# Patient Record
Sex: MALE | Race: BLACK OR AFRICAN AMERICAN | NOT HISPANIC OR LATINO | Employment: UNEMPLOYED | URBAN - METROPOLITAN AREA
[De-identification: names, ages, dates, MRNs, and addresses within clinical notes are randomized per-mention and may not be internally consistent; named-entity substitution may affect disease eponyms.]

---

## 2020-07-13 ENCOUNTER — LAB VISIT (OUTPATIENT)
Dept: LAB | Facility: HOSPITAL | Age: 20
End: 2020-07-13
Attending: INTERNAL MEDICINE
Payer: COMMERCIAL

## 2020-07-13 ENCOUNTER — OFFICE VISIT (OUTPATIENT)
Dept: FAMILY MEDICINE | Facility: CLINIC | Age: 20
End: 2020-07-13
Payer: COMMERCIAL

## 2020-07-13 VITALS
HEART RATE: 80 BPM | SYSTOLIC BLOOD PRESSURE: 120 MMHG | OXYGEN SATURATION: 99 % | TEMPERATURE: 97 F | WEIGHT: 160 LBS | HEIGHT: 71 IN | BODY MASS INDEX: 22.4 KG/M2 | DIASTOLIC BLOOD PRESSURE: 84 MMHG

## 2020-07-13 DIAGNOSIS — Z00.00 NORMAL PHYSICAL EXAM: Primary | ICD-10-CM

## 2020-07-13 DIAGNOSIS — Z11.3 ROUTINE SCREENING FOR STI (SEXUALLY TRANSMITTED INFECTION): ICD-10-CM

## 2020-07-13 DIAGNOSIS — Z00.00 NORMAL PHYSICAL EXAM: ICD-10-CM

## 2020-07-13 LAB
BASOPHILS # BLD AUTO: 0.01 K/UL (ref 0–0.2)
BASOPHILS NFR BLD: 0.3 % (ref 0–1.9)
DIFFERENTIAL METHOD: ABNORMAL
EOSINOPHIL # BLD AUTO: 0 K/UL (ref 0–0.5)
EOSINOPHIL NFR BLD: 0.3 % (ref 0–8)
ERYTHROCYTE [DISTWIDTH] IN BLOOD BY AUTOMATED COUNT: 11.9 % (ref 11.5–14.5)
HCT VFR BLD AUTO: 42.9 % (ref 40–54)
HGB BLD-MCNC: 14.6 G/DL (ref 14–18)
IMM GRANULOCYTES # BLD AUTO: 0.01 K/UL (ref 0–0.04)
IMM GRANULOCYTES NFR BLD AUTO: 0.3 % (ref 0–0.5)
LYMPHOCYTES # BLD AUTO: 1.1 K/UL (ref 1–4.8)
LYMPHOCYTES NFR BLD: 29.2 % (ref 18–48)
MCH RBC QN AUTO: 31.4 PG (ref 27–31)
MCHC RBC AUTO-ENTMCNC: 34 G/DL (ref 32–36)
MCV RBC AUTO: 92 FL (ref 82–98)
MONOCYTES # BLD AUTO: 0.3 K/UL (ref 0.3–1)
MONOCYTES NFR BLD: 6.5 % (ref 4–15)
NEUTROPHILS # BLD AUTO: 2.4 K/UL (ref 1.8–7.7)
NEUTROPHILS NFR BLD: 63.4 % (ref 38–73)
NRBC BLD-RTO: 0 /100 WBC
PLATELET # BLD AUTO: 184 K/UL (ref 150–350)
PMV BLD AUTO: 11.9 FL (ref 9.2–12.9)
RBC # BLD AUTO: 4.65 M/UL (ref 4.6–6.2)
WBC # BLD AUTO: 3.83 K/UL (ref 3.9–12.7)

## 2020-07-13 PROCEDURE — 36415 COLL VENOUS BLD VENIPUNCTURE: CPT | Mod: PO

## 2020-07-13 PROCEDURE — 99999 PR PBB SHADOW E&M-NEW PATIENT-LVL III: ICD-10-PCS | Mod: PBBFAC,,, | Performed by: INTERNAL MEDICINE

## 2020-07-13 PROCEDURE — 99999 PR PBB SHADOW E&M-NEW PATIENT-LVL III: CPT | Mod: PBBFAC,,, | Performed by: INTERNAL MEDICINE

## 2020-07-13 PROCEDURE — 84443 ASSAY THYROID STIM HORMONE: CPT

## 2020-07-13 PROCEDURE — 86706 HEP B SURFACE ANTIBODY: CPT

## 2020-07-13 PROCEDURE — 87340 HEPATITIS B SURFACE AG IA: CPT

## 2020-07-13 PROCEDURE — 99385 PREV VISIT NEW AGE 18-39: CPT | Mod: S$GLB,,, | Performed by: INTERNAL MEDICINE

## 2020-07-13 PROCEDURE — 86592 SYPHILIS TEST NON-TREP QUAL: CPT

## 2020-07-13 PROCEDURE — 99385 PR PREVENTIVE VISIT,NEW,18-39: ICD-10-PCS | Mod: S$GLB,,, | Performed by: INTERNAL MEDICINE

## 2020-07-13 PROCEDURE — 85025 COMPLETE CBC W/AUTO DIFF WBC: CPT

## 2020-07-13 PROCEDURE — 86703 HIV-1/HIV-2 1 RESULT ANTBDY: CPT

## 2020-07-13 PROCEDURE — 80053 COMPREHEN METABOLIC PANEL: CPT

## 2020-07-13 PROCEDURE — 80061 LIPID PANEL: CPT

## 2020-07-13 NOTE — PROGRESS NOTES
This note was created by combination of typed  and M-Modal dictation.  Transcription errors may be present.  If there are any questions, please contact me.    Assessment:     1. Normal physical exam  Comprehensive metabolic panel    CBC auto differential    Lipid Panel    TSH   2. Routine screening for STI (sexually transmitted infection)  C. trachomatis/N. gonorrhoeae by AMP DNA    Hepatitis B Surface Ab, Qualitative    Hepatitis B Surface Antigen    RPR    HIV 1/2 Ag/Ab (4th Gen)       Plan:   1. Check basic nonfasting labs.  He is more physical activity.  Recommended against inhaling substances in general  2. Girlfriend apparently tested positive for syphilis.  Long discussion with the patient.  Pretest counseling provided.  Check HIV, syphilis, hep B, chlamydia, gonorrhea.  If syphilis positive will need penicillin himself.  If negative no treatment      There are no discontinued medications.    meds sent this encounter:       Follow Up: No follow-ups on file. if all normal PE 3 years.    Subjective:     Chief Complaint   Patient presents with    [facility]  John is a 20 y.o. male, last appointment with this clinic was Visit date not found.    NP; previous was with pediatrics. Dr. Christopher Monson  Hx of childhood ADD in elementary school. Not on any meds since that time.    Requesting STI testing.  Girlfriend apparently diagnosed with syphilis.  She is getting IM penicillin injections for 3 weeks.  She apparently had this diagnosed on routine screening.  Apparently she was asymptomatic.  The patient himself is asymptomatic.  He has been monogamous with her since February.    He does note on review of systems that maybe he gets a hypopigmented rash on his shaft.  The past month or 2.  Painless.  No ulceration.  No growth.  No dysuria.  No discharge.    Needs more physical activity    THC use.  Recommended against inhaling substances as it can cause long term lung  problems/damage.      Patient Care Team:  Kenny Camejo MD as PCP - General (Internal Medicine)  Christopher Monson MD as Consulting Physician (Pediatrics)    There are no active problems to display for this patient.      PAST MEDICAL HISTORY:  History reviewed. No pertinent past medical history.    PAST SURGICAL HISTORY:  History reviewed. No pertinent surgical history.    Family History   Problem Relation Age of Onset    No Known Problems Mother     No Known Problems Father     No Known Problems Brother     Diabetes Maternal Grandmother     Hypertension Maternal Grandmother     No Known Problems Brother        SOCIAL HISTORY:  Social History     Socioeconomic History    Marital status: Single     Spouse name: Not on file    Number of children: Not on file    Years of education: Not on file    Highest education level: Not on file   Occupational History    Occupation: self employed - music studio   Social Needs    Financial resource strain: Not on file    Food insecurity     Worry: Not on file     Inability: Not on file    Transportation needs     Medical: Not on file     Non-medical: Not on file   Tobacco Use    Smoking status: Never Smoker    Smokeless tobacco: Never Used   Substance and Sexual Activity    Alcohol use: Yes     Comment: occasional, 2-3 times a month.     Drug use: Yes     Types: Marijuana    Sexual activity: Yes   Lifestyle    Physical activity     Days per week: Not on file     Minutes per session: Not on file    Stress: Not on file   Relationships    Social connections     Talks on phone: Not on file     Gets together: Not on file     Attends Islam service: Not on file     Active member of club or organization: Not on file     Attends meetings of clubs or organizations: Not on file     Relationship status: Not on file   Other Topics Concern    Not on file   Social History Narrative    Not on file       ALLERGIES AND MEDICATIONS: updated and reviewed.  Review of  "patient's allergies indicates:  Not on File  No current outpatient medications on file.     No current facility-administered medications for this visit.        Review of Systems   Constitutional: Negative for fever, malaise/fatigue and weight loss.   HENT: Negative for congestion.    Eyes: Negative for blurred vision and pain.   Respiratory: Negative for shortness of breath and wheezing.    Cardiovascular: Negative for chest pain, palpitations and leg swelling.   Gastrointestinal: Negative for abdominal pain, blood in stool, constipation, diarrhea and heartburn.   Genitourinary: Negative for dysuria, hematuria and urgency.   Musculoskeletal: Negative for joint pain.   Neurological: Negative for tingling, focal weakness, weakness and headaches.       Objective:   Physical Exam   Vitals:    07/13/20 1503   BP: 120/84   BP Location: Left arm   Patient Position: Sitting   BP Method: Medium (Manual)   Pulse: 80   Temp: 97 °F (36.1 °C)   TempSrc: Oral   SpO2: 99%   Weight: 72.6 kg (160 lb)   Height: 5' 11" (1.803 m)    Body mass index is 22.32 kg/m².            Physical Exam  Constitutional:       Appearance: Normal appearance. He is well-developed.   HENT:      Right Ear: Tympanic membrane and external ear normal.      Left Ear: Tympanic membrane and external ear normal.      Nose: Nose normal.   Eyes:      General: No scleral icterus.     Conjunctiva/sclera: Conjunctivae normal.   Neck:      Thyroid: No thyroid mass or thyromegaly.      Trachea: Trachea normal.   Cardiovascular:      Rate and Rhythm: Normal rate and regular rhythm.      Heart sounds: Normal heart sounds, S1 normal and S2 normal. No murmur.   Pulmonary:      Effort: Pulmonary effort is normal.      Breath sounds: Normal breath sounds.   Abdominal:      General: There is no distension.      Palpations: Abdomen is soft. There is no hepatomegaly, splenomegaly or mass.      Tenderness: There is no abdominal tenderness.   Genitourinary:     Penis: Normal.     "   Scrotum/Testes: Normal.      Comments: Circumcised, testes descended bilaterally, smooth in contour, on the proximal end of the penile shaft, few very small slightly hypopigmented macules, no active scale  Musculoskeletal:         General: No deformity.   Lymphadenopathy:      Cervical: No cervical adenopathy.   Skin:     General: Skin is warm and dry.      Findings: No rash (on exposed skin).      Comments: On exposed skin   Neurological:      Mental Status: He is alert and oriented to person, place, and time.      Cranial Nerves: No cranial nerve deficit.      Sensory: No sensory deficit.      Deep Tendon Reflexes: Reflexes are normal and symmetric.   Psychiatric:         Speech: Speech normal.         Behavior: Behavior normal.         Thought Content: Thought content normal.         Judgment: Judgment normal.

## 2020-07-14 LAB
ALBUMIN SERPL BCP-MCNC: 4.9 G/DL (ref 3.5–5.2)
ALP SERPL-CCNC: 47 U/L (ref 55–135)
ALT SERPL W/O P-5'-P-CCNC: 13 U/L (ref 10–44)
ANION GAP SERPL CALC-SCNC: 10 MMOL/L (ref 8–16)
AST SERPL-CCNC: 17 U/L (ref 10–40)
BILIRUB SERPL-MCNC: 1.2 MG/DL (ref 0.1–1)
BUN SERPL-MCNC: 14 MG/DL (ref 6–20)
CALCIUM SERPL-MCNC: 10 MG/DL (ref 8.7–10.5)
CHLORIDE SERPL-SCNC: 108 MMOL/L (ref 95–110)
CHOLEST SERPL-MCNC: 147 MG/DL (ref 120–199)
CHOLEST/HDLC SERPL: 4.1 {RATIO} (ref 2–5)
CO2 SERPL-SCNC: 26 MMOL/L (ref 23–29)
CREAT SERPL-MCNC: 1.1 MG/DL (ref 0.5–1.4)
EST. GFR  (AFRICAN AMERICAN): >60 ML/MIN/1.73 M^2
EST. GFR  (NON AFRICAN AMERICAN): >60 ML/MIN/1.73 M^2
GLUCOSE SERPL-MCNC: 93 MG/DL (ref 70–110)
HBV SURFACE AB SER-ACNC: POSITIVE M[IU]/ML
HBV SURFACE AG SERPL QL IA: NEGATIVE
HDLC SERPL-MCNC: 36 MG/DL (ref 40–75)
HDLC SERPL: 24.5 % (ref 20–50)
HIV 1+2 AB+HIV1 P24 AG SERPL QL IA: NEGATIVE
LDLC SERPL CALC-MCNC: 98 MG/DL (ref 63–159)
NONHDLC SERPL-MCNC: 111 MG/DL
POTASSIUM SERPL-SCNC: 3.6 MMOL/L (ref 3.5–5.1)
PROT SERPL-MCNC: 8.5 G/DL (ref 6–8.4)
RPR SER QL: NORMAL
SODIUM SERPL-SCNC: 144 MMOL/L (ref 136–145)
TRIGL SERPL-MCNC: 65 MG/DL (ref 30–150)
TSH SERPL DL<=0.005 MIU/L-ACNC: 1.13 UIU/ML (ref 0.4–4)

## 2020-07-14 NOTE — PROGRESS NOTES
CMP normal  CBC normal.  Mildly low total white blood count  Lipid normal  TSH normal    RPR negative.  Girlfriend apparently tested positive.  She was in symptomatic.  He is asymptomatic.  HIV screening negative  Hep B immune    GC, Chl pending  Results to pt via [employer].  No further action.

## 2020-07-14 NOTE — PROGRESS NOTES
CMP normal  CBC normal.  Mildly low total white blood count  Lipid normal  TSH normal  STI panel pending  Results so far to patient via [email or web address].

## 2020-07-16 ENCOUNTER — TELEPHONE (OUTPATIENT)
Dept: FAMILY MEDICINE | Facility: CLINIC | Age: 20
End: 2020-07-16

## 2020-07-16 DIAGNOSIS — A74.9 CHLAMYDIA: Primary | ICD-10-CM

## 2020-07-16 RX ORDER — AZITHROMYCIN 500 MG/1
1000 TABLET, FILM COATED ORAL ONCE
Qty: 2 TABLET | Refills: 0 | Status: SHIPPED | OUTPATIENT
Start: 2020-07-16 | End: 2020-07-16

## 2020-07-16 NOTE — TELEPHONE ENCOUNTER
Please call pt - chlamydia positive - needs antibiotic. His girlfriend was being treated for positive syphilis test - his was negative.  She should talk to her doctor about whether she was tested for chlamydia and what the result was - may need to be treated as well.     I will send in antibiotic - zithromax - 2 pills, take at the same time, 1 time.

## 2020-07-23 ENCOUNTER — PATIENT MESSAGE (OUTPATIENT)
Dept: FAMILY MEDICINE | Facility: CLINIC | Age: 20
End: 2020-07-23

## 2020-08-05 NOTE — PROGRESS NOTES
This note was created by combination of typed  and M-Modal dictation.  Transcription errors may be present.  If there are any questions, please contact me.    Assessment and Plan:   Routine screening for STI (sexually transmitted infection)  -girlfriend reportedly tested positive for syphilis, new dx, and treated with PCN injection x 3  Pt previously negative RPR  Repeat test; no symptoms; if negative, would not treat.  Repeat GC, chl, s/p treatment for chl  -     C. trachomatis/N. gonorrhoeae by AMP DNA; Future; Expected date: 08/06/2020  -     RPR; Future; Expected date: 08/06/2020    Need for hepatitis C screening test  -routine screening  -     Hepatitis C Antibody; Future; Expected date: 08/06/2020    There are no discontinued medications.    meds sent this encounter:       Follow Up: No follow-ups on file.    Subjective:     Chief Complaint   Patient presents with    Exposure to STD       HPI  John is a 20 y.o. male, last appointment with this clinic was 7/13/2020.    Last seen for PEx  STI testing, GF reportedly positive for syphilis. His RPR negative, his chl positive.    No symptoms but wants retesting.   Both he and partner treated for chl.  Has resumed sexual activity with her since his last testing.   By report girlfriend was getting routine STI testing and by report syphilis was negative until she became sexually active with him.     Patient Care Team:  Kenny Camejo MD as PCP - General (Internal Medicine)  Christopher Monson MD as Consulting Physician (Pediatrics)    There are no active problems to display for this patient.      PAST MEDICAL HISTORY:  No past medical history on file.    PAST SURGICAL HISTORY:  No past surgical history on file.    SOCIAL HISTORY:  Social History     Socioeconomic History    Marital status: Single     Spouse name: Not on file    Number of children: Not on file    Years of education: Not on file    Highest education level: Not on file   Occupational  "History    Occupation: self employed - music studio   Social Needs    Financial resource strain: Not on file    Food insecurity     Worry: Not on file     Inability: Not on file    Transportation needs     Medical: Not on file     Non-medical: Not on file   Tobacco Use    Smoking status: Never Smoker    Smokeless tobacco: Never Used   Substance and Sexual Activity    Alcohol use: Yes     Comment: occasional, 2-3 times a month.     Drug use: Yes     Types: Marijuana    Sexual activity: Yes   Lifestyle    Physical activity     Days per week: Not on file     Minutes per session: Not on file    Stress: Not on file   Relationships    Social connections     Talks on phone: Not on file     Gets together: Not on file     Attends Adventism service: Not on file     Active member of club or organization: Not on file     Attends meetings of clubs or organizations: Not on file     Relationship status: Not on file   Other Topics Concern    Not on file   Social History Narrative    Not on file       ALLERGIES AND MEDICATIONS: updated and reviewed.  Review of patient's allergies indicates:  No Known Allergies         Review of Systems   Constitutional: Negative for chills and fever.   Respiratory: Negative for shortness of breath.    Cardiovascular: Negative for chest pain.   Genitourinary: Negative for dysuria, frequency and urgency.   Skin: Negative for rash.       Objective:   Physical Exam   Vitals:    08/06/20 1125 08/06/20 1141   BP: (!) 140/86 136/80   BP Location: Left arm Left arm   Patient Position: Sitting Sitting   BP Method: Medium (Manual) Medium (Manual)   Pulse: 64    Temp: 97.7 °F (36.5 °C)    TempSrc: Temporal    SpO2: 98%    Weight: 71.2 kg (157 lb)    Height: 5' 11" (1.803 m)     Body mass index is 21.9 kg/m².            Physical Exam  Constitutional:       General: He is not in acute distress.     Appearance: He is well-developed.   Cardiovascular:      Rate and Rhythm: Normal rate and regular " rhythm.      Heart sounds: Normal heart sounds. No murmur.   Pulmonary:      Effort: Pulmonary effort is normal.      Breath sounds: Normal breath sounds.   Musculoskeletal: Normal range of motion.      Right lower leg: No edema.      Left lower leg: No edema.   Skin:     General: Skin is warm and dry.   Neurological:      Mental Status: He is alert and oriented to person, place, and time.      Coordination: Coordination normal.   Psychiatric:         Behavior: Behavior normal.         Thought Content: Thought content normal.

## 2020-08-06 ENCOUNTER — LAB VISIT (OUTPATIENT)
Dept: LAB | Facility: HOSPITAL | Age: 20
End: 2020-08-06
Attending: INTERNAL MEDICINE
Payer: COMMERCIAL

## 2020-08-06 ENCOUNTER — OFFICE VISIT (OUTPATIENT)
Dept: FAMILY MEDICINE | Facility: CLINIC | Age: 20
End: 2020-08-06
Payer: COMMERCIAL

## 2020-08-06 VITALS
TEMPERATURE: 98 F | HEART RATE: 64 BPM | BODY MASS INDEX: 21.98 KG/M2 | WEIGHT: 157 LBS | SYSTOLIC BLOOD PRESSURE: 136 MMHG | HEIGHT: 71 IN | DIASTOLIC BLOOD PRESSURE: 80 MMHG | OXYGEN SATURATION: 98 %

## 2020-08-06 DIAGNOSIS — Z11.59 NEED FOR HEPATITIS C SCREENING TEST: ICD-10-CM

## 2020-08-06 DIAGNOSIS — Z11.3 ROUTINE SCREENING FOR STI (SEXUALLY TRANSMITTED INFECTION): ICD-10-CM

## 2020-08-06 DIAGNOSIS — Z11.3 ROUTINE SCREENING FOR STI (SEXUALLY TRANSMITTED INFECTION): Primary | ICD-10-CM

## 2020-08-06 PROCEDURE — 3008F BODY MASS INDEX DOCD: CPT | Mod: CPTII,S$GLB,, | Performed by: INTERNAL MEDICINE

## 2020-08-06 PROCEDURE — 3008F PR BODY MASS INDEX (BMI) DOCUMENTED: ICD-10-PCS | Mod: CPTII,S$GLB,, | Performed by: INTERNAL MEDICINE

## 2020-08-06 PROCEDURE — 86803 HEPATITIS C AB TEST: CPT

## 2020-08-06 PROCEDURE — 99999 PR PBB SHADOW E&M-EST. PATIENT-LVL III: CPT | Mod: PBBFAC,,, | Performed by: INTERNAL MEDICINE

## 2020-08-06 PROCEDURE — 36415 COLL VENOUS BLD VENIPUNCTURE: CPT | Mod: PO

## 2020-08-06 PROCEDURE — 99999 PR PBB SHADOW E&M-EST. PATIENT-LVL III: ICD-10-PCS | Mod: PBBFAC,,, | Performed by: INTERNAL MEDICINE

## 2020-08-06 PROCEDURE — 99213 PR OFFICE/OUTPT VISIT, EST, LEVL III, 20-29 MIN: ICD-10-PCS | Mod: S$GLB,,, | Performed by: INTERNAL MEDICINE

## 2020-08-06 PROCEDURE — 86592 SYPHILIS TEST NON-TREP QUAL: CPT

## 2020-08-06 PROCEDURE — 99213 OFFICE O/P EST LOW 20 MIN: CPT | Mod: S$GLB,,, | Performed by: INTERNAL MEDICINE

## 2020-08-07 LAB
HCV AB SERPL QL IA: NEGATIVE
RPR SER QL: NORMAL

## 2020-08-09 NOTE — PROGRESS NOTES
RPR still negative, girlfriend reportedly positive. This is 2nd negative on pt  HCV negative  GC, Chl pending  Results to pt so far on mychart. No indication for syphilis treatment

## 2021-08-26 ENCOUNTER — HOSPITAL ENCOUNTER (EMERGENCY)
Facility: HOSPITAL | Age: 21
Discharge: HOME | End: 2021-08-26
Attending: EMERGENCY MEDICINE
Payer: COMMERCIAL

## 2021-08-26 VITALS
RESPIRATION RATE: 14 BRPM | BODY MASS INDEX: 22.19 KG/M2 | OXYGEN SATURATION: 100 % | SYSTOLIC BLOOD PRESSURE: 137 MMHG | HEART RATE: 60 BPM | DIASTOLIC BLOOD PRESSURE: 89 MMHG | HEIGHT: 70 IN | WEIGHT: 155 LBS | TEMPERATURE: 99 F
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
## 2021-08-26 DIAGNOSIS — S46.812A STRAIN OF LEFT TRAPEZIUS MUSCLE, INITIAL ENCOUNTER: ICD-10-CM

## 2021-08-26 PROCEDURE — 99284 EMERGENCY DEPT VISIT MOD MDM: CPT | Mod: ER

## 2021-08-26 RX ORDER — TIZANIDINE 2 MG/1
2 TABLET ORAL EVERY 8 HOURS PRN
Qty: 15 TABLET | Refills: 0 | Status: SHIPPED | OUTPATIENT
Start: 2021-08-26 | End: 2022-07-01

## 2021-08-26 RX ORDER — NAPROXEN 500 MG/1
500 TABLET ORAL EVERY 12 HOURS PRN
Qty: 20 TABLET | Refills: 0 | Status: SHIPPED | OUTPATIENT
Start: 2021-08-26 | End: 2022-07-01

## 2021-09-17 ENCOUNTER — OFFICE VISIT (OUTPATIENT)
Dept: FAMILY MEDICINE | Facility: CLINIC | Age: 21
End: 2021-09-17
Payer: COMMERCIAL

## 2021-09-17 ENCOUNTER — LAB VISIT (OUTPATIENT)
Dept: LAB | Facility: HOSPITAL | Age: 21
End: 2021-09-17
Attending: INTERNAL MEDICINE
Payer: COMMERCIAL

## 2021-09-17 VITALS
HEART RATE: 88 BPM | DIASTOLIC BLOOD PRESSURE: 80 MMHG | SYSTOLIC BLOOD PRESSURE: 130 MMHG | OXYGEN SATURATION: 99 % | TEMPERATURE: 98 F | WEIGHT: 164.81 LBS | BODY MASS INDEX: 23.65 KG/M2

## 2021-09-17 DIAGNOSIS — Z23 NEED FOR TDAP VACCINATION: ICD-10-CM

## 2021-09-17 DIAGNOSIS — Z11.3 ROUTINE SCREENING FOR STI (SEXUALLY TRANSMITTED INFECTION): Primary | ICD-10-CM

## 2021-09-17 DIAGNOSIS — Z11.3 ROUTINE SCREENING FOR STI (SEXUALLY TRANSMITTED INFECTION): ICD-10-CM

## 2021-09-17 DIAGNOSIS — Z23 NEED FOR HPV VACCINATION: ICD-10-CM

## 2021-09-17 PROCEDURE — 90651 HPV VACCINE 9-VALENT 3 DOSE IM: ICD-10-PCS | Mod: S$GLB,,, | Performed by: INTERNAL MEDICINE

## 2021-09-17 PROCEDURE — 3075F PR MOST RECENT SYSTOLIC BLOOD PRESS GE 130-139MM HG: ICD-10-PCS | Mod: CPTII,S$GLB,, | Performed by: INTERNAL MEDICINE

## 2021-09-17 PROCEDURE — 90472 IMMUNIZATION ADMIN EACH ADD: CPT | Mod: S$GLB,,, | Performed by: INTERNAL MEDICINE

## 2021-09-17 PROCEDURE — 3008F BODY MASS INDEX DOCD: CPT | Mod: CPTII,S$GLB,, | Performed by: INTERNAL MEDICINE

## 2021-09-17 PROCEDURE — 36415 COLL VENOUS BLD VENIPUNCTURE: CPT | Mod: PO | Performed by: INTERNAL MEDICINE

## 2021-09-17 PROCEDURE — 3075F SYST BP GE 130 - 139MM HG: CPT | Mod: CPTII,S$GLB,, | Performed by: INTERNAL MEDICINE

## 2021-09-17 PROCEDURE — 87389 HIV-1 AG W/HIV-1&-2 AB AG IA: CPT | Performed by: INTERNAL MEDICINE

## 2021-09-17 PROCEDURE — 1159F PR MEDICATION LIST DOCUMENTED IN MEDICAL RECORD: ICD-10-PCS | Mod: CPTII,S$GLB,, | Performed by: INTERNAL MEDICINE

## 2021-09-17 PROCEDURE — 90471 IMMUNIZATION ADMIN: CPT | Mod: S$GLB,,, | Performed by: INTERNAL MEDICINE

## 2021-09-17 PROCEDURE — 90715 TDAP VACCINE GREATER THAN OR EQUAL TO 7YO IM: ICD-10-PCS | Mod: S$GLB,,, | Performed by: INTERNAL MEDICINE

## 2021-09-17 PROCEDURE — 90715 TDAP VACCINE 7 YRS/> IM: CPT | Mod: S$GLB,,, | Performed by: INTERNAL MEDICINE

## 2021-09-17 PROCEDURE — 86592 SYPHILIS TEST NON-TREP QUAL: CPT | Performed by: INTERNAL MEDICINE

## 2021-09-17 PROCEDURE — 1160F PR REVIEW ALL MEDS BY PRESCRIBER/CLIN PHARMACIST DOCUMENTED: ICD-10-PCS | Mod: CPTII,S$GLB,, | Performed by: INTERNAL MEDICINE

## 2021-09-17 PROCEDURE — 90471 HPV VACCINE 9-VALENT 3 DOSE IM: ICD-10-PCS | Mod: S$GLB,,, | Performed by: INTERNAL MEDICINE

## 2021-09-17 PROCEDURE — 3079F DIAST BP 80-89 MM HG: CPT | Mod: CPTII,S$GLB,, | Performed by: INTERNAL MEDICINE

## 2021-09-17 PROCEDURE — 3008F PR BODY MASS INDEX (BMI) DOCUMENTED: ICD-10-PCS | Mod: CPTII,S$GLB,, | Performed by: INTERNAL MEDICINE

## 2021-09-17 PROCEDURE — 99213 PR OFFICE/OUTPT VISIT, EST, LEVL III, 20-29 MIN: ICD-10-PCS | Mod: 25,S$GLB,, | Performed by: INTERNAL MEDICINE

## 2021-09-17 PROCEDURE — 3079F PR MOST RECENT DIASTOLIC BLOOD PRESSURE 80-89 MM HG: ICD-10-PCS | Mod: CPTII,S$GLB,, | Performed by: INTERNAL MEDICINE

## 2021-09-17 PROCEDURE — 1160F RVW MEDS BY RX/DR IN RCRD: CPT | Mod: CPTII,S$GLB,, | Performed by: INTERNAL MEDICINE

## 2021-09-17 PROCEDURE — 1159F MED LIST DOCD IN RCRD: CPT | Mod: CPTII,S$GLB,, | Performed by: INTERNAL MEDICINE

## 2021-09-17 PROCEDURE — 90651 9VHPV VACCINE 2/3 DOSE IM: CPT | Mod: S$GLB,,, | Performed by: INTERNAL MEDICINE

## 2021-09-17 PROCEDURE — 99999 PR PBB SHADOW E&M-EST. PATIENT-LVL III: CPT | Mod: PBBFAC,,, | Performed by: INTERNAL MEDICINE

## 2021-09-17 PROCEDURE — 99213 OFFICE O/P EST LOW 20 MIN: CPT | Mod: 25,S$GLB,, | Performed by: INTERNAL MEDICINE

## 2021-09-17 PROCEDURE — 90472 TDAP VACCINE GREATER THAN OR EQUAL TO 7YO IM: ICD-10-PCS | Mod: S$GLB,,, | Performed by: INTERNAL MEDICINE

## 2021-09-17 PROCEDURE — 99999 PR PBB SHADOW E&M-EST. PATIENT-LVL III: ICD-10-PCS | Mod: PBBFAC,,, | Performed by: INTERNAL MEDICINE

## 2021-09-18 LAB — RPR SER QL: NORMAL

## 2021-09-20 LAB — HIV 1+2 AB+HIV1 P24 AG SERPL QL IA: NEGATIVE

## 2022-01-02 ENCOUNTER — PATIENT MESSAGE (OUTPATIENT)
Dept: ADMINISTRATIVE | Facility: OTHER | Age: 22
End: 2022-01-02
Payer: COMMERCIAL

## 2022-01-02 ENCOUNTER — LAB VISIT (OUTPATIENT)
Dept: PRIMARY CARE CLINIC | Facility: OTHER | Age: 22
End: 2022-01-02
Attending: INTERNAL MEDICINE
Payer: COMMERCIAL

## 2022-01-02 DIAGNOSIS — Z20.822 ENCOUNTER FOR LABORATORY TESTING FOR COVID-19 VIRUS: ICD-10-CM

## 2022-01-02 DIAGNOSIS — R05.9 COUGH: ICD-10-CM

## 2022-01-02 PROCEDURE — U0003 INFECTIOUS AGENT DETECTION BY NUCLEIC ACID (DNA OR RNA); SEVERE ACUTE RESPIRATORY SYNDROME CORONAVIRUS 2 (SARS-COV-2) (CORONAVIRUS DISEASE [COVID-19]), AMPLIFIED PROBE TECHNIQUE, MAKING USE OF HIGH THROUGHPUT TECHNOLOGIES AS DESCRIBED BY CMS-2020-01-R: HCPCS | Mod: ST72 | Performed by: INTERNAL MEDICINE

## 2022-01-08 LAB
SARS-COV-2 RNA RESP QL NAA+PROBE: DETECTED
TEST PERFORMANCE INFO SPEC: ABNORMAL

## 2022-01-10 ENCOUNTER — PATIENT MESSAGE (OUTPATIENT)
Dept: ADMINISTRATIVE | Facility: OTHER | Age: 22
End: 2022-01-10
Payer: COMMERCIAL

## 2022-01-10 ENCOUNTER — LAB VISIT (OUTPATIENT)
Dept: PRIMARY CARE CLINIC | Facility: OTHER | Age: 22
End: 2022-01-10
Attending: INTERNAL MEDICINE
Payer: COMMERCIAL

## 2022-01-10 DIAGNOSIS — R05.9 COUGH: ICD-10-CM

## 2022-01-10 DIAGNOSIS — Z20.822 ENCOUNTER FOR LABORATORY TESTING FOR COVID-19 VIRUS: ICD-10-CM

## 2022-01-10 PROCEDURE — U0003 INFECTIOUS AGENT DETECTION BY NUCLEIC ACID (DNA OR RNA); SEVERE ACUTE RESPIRATORY SYNDROME CORONAVIRUS 2 (SARS-COV-2) (CORONAVIRUS DISEASE [COVID-19]), AMPLIFIED PROBE TECHNIQUE, MAKING USE OF HIGH THROUGHPUT TECHNOLOGIES AS DESCRIBED BY CMS-2020-01-R: HCPCS | Performed by: INTERNAL MEDICINE

## 2022-01-12 LAB
SARS-COV-2 RNA RESP QL NAA+PROBE: DETECTED
SARS-COV-2- CYCLE NUMBER: 35

## 2022-04-08 ENCOUNTER — TELEPHONE (OUTPATIENT)
Dept: INTERNAL MEDICINE | Facility: CLINIC | Age: 22
End: 2022-04-08
Payer: COMMERCIAL

## 2022-04-08 NOTE — TELEPHONE ENCOUNTER
Please let pharmacy know that I have never seen nor evaluated patient and I have never written a prescription for patient nor any controlled substances for this patient. Recent prescription presented by patient is a forgery and I recommend pharmacist contact local police and MICHAEL to file report.

## 2022-04-08 NOTE — TELEPHONE ENCOUNTER
----- Message from Arun Campbell sent at 4/8/2022 11:44 AM CDT -----      Name of Who is Calling: PEDRITO CHIN [[id number]]      What is the request in detail: Kelly called with possible forgery for this pt they were asked to fill Promethazine and Tylenol 500 and gave pharmacy Dr Ramires MICHAEL and [facility] numbers.Pt has never seen the provider before.Please contact to further discuss and advise.           Can the clinic reply by [facility]: N      What Number to Call Back if not in [facility]: [id number] - [place] - [address] AT [facility]

## 2022-06-30 NOTE — PROGRESS NOTES
This note was created by combination of typed  and M-Modal dictation.  Transcription errors may be present.  If there are any questions, please contact me.    Assessment and Plan:   Routine screening for STI (sexually transmitted infection)  Penile discharge  -unfortunately just found out he lost insurance  Wants urine testing, wants to hold on blood testing; hold on empiric antibiotic  Check GC, Chl  Partner will also be tested  If positive, gave him contact information for low cost clinics - including [place] [facility] on [address] - if he needs ceftriaxone shot  -     C. trachomatis/N. gonorrhoeae by AMP DNA; Future; Expected date: 07/01/2022  -     Cancel: HIV 1/2 Ag/Ab (4th Gen); Future; Expected date: 07/01/2022  -     Cancel: RPR; Future; Expected date: 07/01/2022    Medications Discontinued During This Encounter   Medication Reason    naproxen (NAPROSYN) 500 MG tablet     naproxen (NAPROSYN) 500 MG tablet     naproxen (NAPROSYN) 500 MG tablet     tiZANidine (ZANAFLEX) 2 MG tablet        meds sent this encounter:       Follow Up: No follow-ups on file.    Subjective:     Chief Complaint   Patient presents with    Follow-up       HPI  John is a 22 y.o. male, last appointment with this clinic was Visit date not found.  Social History     Tobacco Use    Smoking status: Never Smoker    Smokeless tobacco: Never Used   Substance Use Topics    Alcohol use: Yes     Comment: occasional, 2-3 times a month.       Social History     Occupational History    Occupation: self employed - music studio      Social History     Social History Narrative    Not on file         Last visit with me in September 2021  STI monitoring negative    Requesting STI testing.  Has been with 3 partners in the past month, serially.  Was in relationship earlier this year, condom use.  Ended that relationship and then was sexually active with another partner around April. now with another partner for the past month or so.   "Yesterday started noticing discharge.  No pain.  urine without issues.  No testicular pain though thinks maybe some skin irritation in the inguinal area. No fever no chills.  She is going to get tested too.  He just found out on checking in that his mother took him off her insurance. So he is self pay today.  Discussed low cost options for further testing and treatment.  After discussion he would like to get urine testing today and hold off on blood testing.  And hold on empiric antibiotic injection.     Patient Care Team:  Kenny Camejo MD as PCP - General (Internal Medicine)  Christopher Monson MD as Consulting Physician (Pediatrics)     There are no problems to display for this patient.    Patient Care Team:  Kenny Camejo MD as PCP - General (Internal Medicine)  Christopher Monson MD as Consulting Physician (Pediatrics)    There are no problems to display for this patient.      PAST MEDICAL PROBLEMS, PAST SURGICAL HISTORY: please see relevant portions of the electronic medical record    ALLERGIES AND MEDICATIONS: updated and reviewed.  Review of patient's allergies indicates:  No Known Allergies    Medication List with Changes/Refills   Current Medications    NAPROXEN (NAPROSYN) 500 MG TABLET    Take 1 tablet (500 mg total) by mouth every 12 (twelve) hours as needed (Pain).    TIZANIDINE (ZANAFLEX) 2 MG TABLET    Take 1 tablet (2 mg total) by mouth every 8 (eight) hours as needed (Muscle Spasms).        Objective:   Physical Exam   Vitals:    07/01/22 1427   BP: (!) 120/90   BP Location: Right arm   Patient Position: Sitting   BP Method: Medium (Manual)   Pulse: 86   Temp: 99 °F (37.2 °C)   TempSrc: Oral   SpO2: 97%   Weight: 80.3 kg (177 lb 2.2 oz)   Height: 5' 10" (1.778 m)    Body mass index is 25.42 kg/m².            Physical Exam  Constitutional:       General: He is not in acute distress.     Appearance: He is well-developed.   HENT:      Head: Normocephalic and atraumatic.   Eyes:      General: No " scleral icterus.  Pulmonary:      Effort: Pulmonary effort is normal.   Genitourinary:     Comments: Circumcised, testes descended bilaterally, smooth in contour, no hernias, no visible discharge, testes symmetric in size  Skin:     General: Skin is warm and dry.   Neurological:      Mental Status: He is alert and oriented to person, place, and time.   Psychiatric:         Behavior: Behavior normal.         Thought Content: Thought content normal.

## 2022-07-01 ENCOUNTER — OFFICE VISIT (OUTPATIENT)
Dept: FAMILY MEDICINE | Facility: CLINIC | Age: 22
End: 2022-07-01

## 2022-07-01 VITALS
SYSTOLIC BLOOD PRESSURE: 120 MMHG | HEIGHT: 70 IN | HEART RATE: 86 BPM | BODY MASS INDEX: 25.36 KG/M2 | WEIGHT: 177.13 LBS | OXYGEN SATURATION: 97 % | TEMPERATURE: 99 F | DIASTOLIC BLOOD PRESSURE: 90 MMHG

## 2022-07-01 DIAGNOSIS — R36.9 PENILE DISCHARGE: ICD-10-CM

## 2022-07-01 DIAGNOSIS — Z11.3 ROUTINE SCREENING FOR STI (SEXUALLY TRANSMITTED INFECTION): Primary | ICD-10-CM

## 2022-07-01 PROCEDURE — 99999 PR PBB SHADOW E&M-EST. PATIENT-LVL IV: CPT | Mod: PBBFAC,,, | Performed by: INTERNAL MEDICINE

## 2022-07-01 PROCEDURE — 87491 CHLMYD TRACH DNA AMP PROBE: CPT | Performed by: INTERNAL MEDICINE

## 2022-07-01 PROCEDURE — 99213 PR OFFICE/OUTPT VISIT, EST, LEVL III, 20-29 MIN: ICD-10-PCS | Mod: S$PBB,,, | Performed by: INTERNAL MEDICINE

## 2022-07-01 PROCEDURE — 87591 N.GONORRHOEAE DNA AMP PROB: CPT | Performed by: INTERNAL MEDICINE

## 2022-07-01 PROCEDURE — 99999 PR PBB SHADOW E&M-EST. PATIENT-LVL IV: ICD-10-PCS | Mod: PBBFAC,,, | Performed by: INTERNAL MEDICINE

## 2022-07-01 PROCEDURE — 99213 OFFICE O/P EST LOW 20 MIN: CPT | Mod: S$PBB,,, | Performed by: INTERNAL MEDICINE

## 2022-07-01 PROCEDURE — 99214 OFFICE O/P EST MOD 30 MIN: CPT | Mod: PBBFAC,PO | Performed by: INTERNAL MEDICINE

## 2022-07-03 LAB
C TRACH DNA SPEC QL NAA+PROBE: NOT DETECTED
N GONORRHOEA DNA SPEC QL NAA+PROBE: NOT DETECTED

## 2022-07-04 NOTE — PROGRESS NOTES
GC, Chl negative  Had c/o discharge.  In between insurance currently  Results to pt via [facility]. If still with symptoms would recommend re-evaluation and possible empiric treatment.  List of sliding scale clinics given to pt at [facility] and also in message